# Patient Record
Sex: FEMALE | Race: WHITE
[De-identification: names, ages, dates, MRNs, and addresses within clinical notes are randomized per-mention and may not be internally consistent; named-entity substitution may affect disease eponyms.]

---

## 2021-08-03 ENCOUNTER — HOSPITAL ENCOUNTER (INPATIENT)
Dept: HOSPITAL 41 - JD.OBCHECK | Age: 21
LOS: 3 days | Discharge: HOME | DRG: 560 | End: 2021-08-06
Attending: OBSTETRICS & GYNECOLOGY | Admitting: OBSTETRICS & GYNECOLOGY
Payer: COMMERCIAL

## 2021-08-03 DIAGNOSIS — F41.9: ICD-10-CM

## 2021-08-03 DIAGNOSIS — Z20.822: ICD-10-CM

## 2021-08-03 DIAGNOSIS — F32.9: ICD-10-CM

## 2021-08-03 DIAGNOSIS — Z3A.37: ICD-10-CM

## 2021-08-03 PROCEDURE — U0002 COVID-19 LAB TEST NON-CDC: HCPCS

## 2021-08-03 RX ADMIN — EPHEDRINE SULFATE PRN MG: 50 INJECTION, SOLUTION INTRAVENOUS at 23:54

## 2021-08-03 RX ADMIN — EPHEDRINE SULFATE PRN MG: 50 INJECTION, SOLUTION INTRAVENOUS at 23:59

## 2021-08-03 NOTE — PCM.PREANE
Preanesthetic Assessment





- Procedure


Proposed Procedure: 





Labor epidural





- Anesthesia/Transfusion/Family Hx


Anesthesia History: Prior Anesthesia Without Reaction


Family History of Anesthesia Reaction: No


Transfusion History: No Prior Transfusion(s)


Intubation History: Unknown





- Review of Systems


General: No Symptoms


Pulmonary: No Symptoms


Cardiovascular: No Symptoms


Gastrointestinal: Abdominal Pain (uterine contraction), Nausea, Vomiting


Neurological: No Symptoms


Other: Reports: Anxiety





- Physical Assessment


NPO Status Date: 08/03/21


NPO Status Time: 12:00


Vital Signs: 





131/75


HR 81


 RR 16


98%


Height: 1.65 m


Weight: 96.162 kg


ASA Class: 2


Mental Status: Alert & Oriented x3


Airway Class: Mallampati = 2


Dentition: Reports: Normal Dentition


Thyro-Mental Finger Breadths: 3


Mouth Opening Finger Breadths: 3


ROM/Head Extension: Full


Lungs: Clear to Auscultation, Normal Respiratory Effort


Cardiovascular: Regular Rate, Regular Rhythm





- Lab


Values: 





Labs within acceptable limits and okay to proceed with epidural





- Allergies


Allergies/Adverse Reactions: 


                                    Allergies











Allergy/AdvReac Type Severity Reaction Status Date / Time


 


No Known Allergies Allergy   Verified 06/02/21 10:04














- Blood


Blood Available: No


Product(s) Available: None





- Anesthesia Plan


Pre-Op Medication Ordered: None





- Acknowledgements


Anesthesia Type Planned: Epidural


Pt an Appropriate Candidate for the Planned Anesthesia: Yes


Alternatives and Risks of Anesthesia Discussed w Pt/Guardian: Yes


Pt/Guardian Understands and Agrees with Anesthesia Plan: Yes





PreAnesthesia Questionnaire


Cardiovascular History: Reports: Other (See Below)


Other Cardiovascular History: heart palpitations-anxiety


Respiratory History: Reports: Bronchitis, Recurrent


Other Respiratory History: Bronchitis she had a year ago


Gastrointestinal History: Reports: GERD


OB/GYN History: Reports: Pregnancy


Neurological History: Reports: Migraines


Psychiatric History: Reports: Depression





- Past Surgical History


HEENT Surgical History: Reports: Oral Surgery


Other Musculoskeletal Surgeries/Procedures:: Broke left arm as a child (3rd 

grade)





- SUBSTANCE USE


Tobacco Use Status *Q: Never Tobacco User


Tobacco Use Within Last Twelve Months: No


Second Hand Smoke Exposure: No


Days Per Week of Alcohol Use: 0


Number of Drinks Per Day: 0


Total Drinks Per Week: 0


Recreational Drug Use History: No





- HOME MEDS


Home Medications: 


                                    Home Meds





Prenatal No122/Iron/Folic Acid [Prenatal Multi Tablet] 1 each PO DAILY 05/30/21 

[History]


FLUoxetine [PROzac] 20 mg PO DAILY 07/29/21 [History]


cephALEXin [Keflex] 500 mg PO Q6HR 08/01/21 [History]

## 2021-08-03 NOTE — PCM.LDHP
L&D History of Present Illness





- General


Date of Service: 21


Admit Problem/Dx: 


                   Patient Status Order with Admit Dx/Problem





21 16:15


Patient Status [ADT] Routine 








                           Admission Diagnosis/Problem











Admission Diagnosis/Problem    Pregnancy














Source of Information: Patient


History Limitations: Reports: No Limitations





- History of Present Illness


Introduction:: 





Patient is a 21 y/o  at 37 4/7 wks who presented to clinic today for 

assessment after small leakage of fluid noted at lunch.  Amnisure done then and 

positive.  Has had only scant leakage since.  doing well 





- Related Data


Allergies/Adverse Reactions: 


                                    Allergies











Allergy/AdvReac Type Severity Reaction Status Date / Time


 


No Known Allergies Allergy   Verified 21 10:04











Home Medications: 


                                    Home Meds





Prenatal No122/Iron/Folic Acid [Prenatal Multi Tablet] 1 each PO DAILY 21 

[History]


FLUoxetine [PROzac] 20 mg PO DAILY 21 [History]


cephALEXin [Keflex] 500 mg PO Q6HR 21 [History]











Past Medical History


Gastrointestinal History: Reports: GERD


OB/GYN History: Reports: Pregnancy, Spontaneous 


: 4


Para: 0


LMP (Approximate): Pregnant


Neurological History: Reports: Migraines


Psychiatric History: Reports: Anxiety, Depression, Suicide Attempt





- Past Surgical History


HEENT Surgical History: Reports: Oral Surgery


Female  Surgical History: Reports: D&C


Other Musculoskeletal Surgeries/Procedures:: Broke left arm as a child (3rd 

grade)





Social & Family History





- Family History


Family Medical History: No Pertinent Family History





- Tobacco Use


Tobacco Use Status *Q: Never Tobacco User


Second Hand Smoke Exposure: No





- Caffeine Use


Caffeine Use: Reports: Soda





- Alcohol Use


Alcohol Use History: No


Days Per Week of Alcohol Use: 0


Number of Drinks Per Day: 0


Total Drinks Per Week: 0





- Recreational Drug Use


Recreational Drug Use: No





H&P Review of Systems





- Review of Systems:


Review Of Systems: See Below


General: Reports: No Symptoms


Pulmonary: Reports: No Symptoms


Cardiovascular: Reports: No Symptoms


Gastrointestinal: Reports: No Symptoms


Genitourinary: Reports: No Symptoms


Musculoskeletal: Reports: No Symptoms


Psychiatric: Reports: No Symptoms


Neurological: Reports: No Symptoms





L&D Exam





- Exam


Exam: See Below





- Vital Signs


Vital Signs: 


                                Last Vital Signs











Temp  36.6 C   21 15:00


 


Pulse  93   21 15:00


 


Resp  16   21 15:00


 


BP  124/79   21 15:00


 


Pulse Ox  97   21 15:00











Weight: 96.162 kg





- OB Specific


Contraction Intensity: Irritability


Fetal Movement: Active


Fetal Heart Tones: Present


Fetal Heart Tones per Min: 140


Fetal Heart Rate (FHR) Variability: Moderate (6-25 bpm)


Birth Presentation: Vertex





- Berrios Score


Berrios Score Cervix Position: Midposition


Berrios Score Consistency: Medium


Berrios Score Effacement: 51-70%


Berrios Score Dilation: 1-2 cm


Berrios Score Infant's Station: -1 ,0


Berrios Score Total: 7





- Exam


General: Alert, Oriented, Cooperative


Lungs: Clear to Auscultation, Normal Respiratory Effort


Cardiovascular: Regular Rate, Regular Rhythm


GI/Abdominal Exam: Soft, Non-Tender


Genitourinary: Normal external exam


Extremities: Normal Inspection


Skin: Warm, Dry, Intact





- Patient Data


Lab Results Last 24 hrs: 


                         Laboratory Results - last 24 hr











  21 Range/Units





  16:26 16:33 16:33 


 


WBC   9.80   (3.98-10.04)  K/mm3


 


RBC   4.03   (3.98-5.22)  M/mm3


 


Hgb   11.1 L   (11.2-15.7)  gm/dl


 


Hct   34.3   (34.1-44.9)  %


 


MCV   85.1  D   (79.4-94.8)  fl


 


MCH   27.5   (25.6-32.2)  pg


 


MCHC   32.4   (32.2-35.5)  g/dl


 


RDW Std Deviation   41.5   (36.4-46.3)  fL


 


Plt Count   216   (182-369)  K/mm3


 


MPV   11.1   (9.4-12.3)  fl


 


Neut % (Auto)   79.0 H   (34.0-71.1)  %


 


Lymph % (Auto)   15.0 L   (19.3-51.7)  %


 


Mono % (Auto)   4.3 L   (4.7-12.5)  %


 


Eos % (Auto)   1.3   (0.7-5.8)  


 


Baso % (Auto)   0.1   (0.1-1.2)  %


 


Neut # (Auto)   7.74 H   (1.56-6.13)  K/mm3


 


Lymph # (Auto)   1.47   (1.18-3.74)  K/mm3


 


Mono # (Auto)   0.42 H   (0.24-0.36)  K/mm3


 


Eos # (Auto)   0.13   (0.04-0.36)  K/mm3


 


Baso # (Auto)   0.01   (0.01-0.08)  K/mm3


 


SARS-CoV-2 RNA (RAUL)  Negative    (NEGATIVE)  


 


Blood Type    B POSITIVE  


 


Gel Antibody Screen    Negative  











Result Diagrams: 


                                 21 16:33








- Problem List


(1) 37 weeks gestation of pregnancy


SNOMED Code(s): 49035939


   ICD Code: Z3A.37 - 37 WEEKS GESTATION OF PREGNANCY   Status: Acute   Current 

Visit: Yes   





(2) Premature rupture of membranes


SNOMED Code(s): 21313424


   ICD Code: O42.90 - ENE ROM, 7TH0 BETW RUPT & ONST LABR, UNSP WEEKS OF GEST  

 Status: Acute   Current Visit: Yes   


Qualifiers: 


   PROM onset of labor timing: unspecified duration between rupture of membranes

 and onset of labor   PROM gestational age: full term   Qualified Code(s): 

O42.92 - Full-term premature rupture of membranes, unspecified as to length of 

time between rupture and onset of labor   





(3) GBS (group B Streptococcus carrier), +RV culture, currently pregnant


SNOMED Code(s): 1788731448172, 228668966, 2068838308440


   ICD Code: O99.820 - STREPTOCOCCUS B CARRIER STATE COMPLICATING PREGNANCY   

Status: Acute   Current Visit: Yes   





(4) Rubella non-immune status, antepartum


SNOMED Code(s): 145764273


   ICD Code: O99.891 - OTH DISEASES AND CONDITIONS COMPLICATING PREGNANCY; Z28.3

 - UNDERIMMUNIZATION STATUS   Status: Acute   Current Visit: Yes   


Problem List Initiated/Reviewed/Updated: Yes


Orders Last 24hrs: 


                               Active Orders 24 hr











 Category Date Time Status


 


 Patient Status [ADT] Routine ADT  21 16:15 Active


 


 Activity as Tolerated [RC] PFP Care  21 16:15 Active


 


 Communication Order [RC] ASDIRECTED Care  21 16:15 Active


 


 Fetal Heart Tones [RC] ASDIRECTED Care  21 16:16 Active


 


 Fetal Non Stress Test [RC] PER UNIT ROUTINE Care  21 16:15 Active


 


 Notify Provider [RC] PFP Care  21 16:15 Active


 


 Notify Provider [RC] PRN Care  21 16:15 Active


 


 Peripheral IV Care [RC] .AS DIRECTED Care  21 16:16 Active


 


 Pump Management, Intrathecal [RC] ASDIRECTED Care  21 16:17 Active


 


 Urinary Catheter Assessment [RC] ASDIRECTED Care  21 16:14 Active


 


 Vital Signs [RC] PER UNIT ROUTINE Care  21 16:15 Active


 


 Regular Diet [DIET] Diet  21 Dinner Active


 


 PATIENT RETYPE [BBK] Routine Lab  21 17:44 Ordered


 


 RAPID PLASMA REAGIN,RPR [CHEM] Routine Lab  21 16:33 Received


 


 Ampicillin 1 gm Med  21 20:00 Active





 Sodium Chloride 0.9% [Normal Saline] 100 ml   





 IV Q4H   


 


 Lactated Ringers [Ringers, Lactated] 1,000 ml Med  21 16:15 Active





 IV ASDIRECTED   


 


 Lidocaine 1% [Xylocaine 1%] Med  21 16:14 Active





 50 ml INJECT ONETIME   


 


 Nalbuphine [Nubain] Med  21 16:14 Active





 10 mg IVPUSH Q2H PRN   


 


 Oxytocin/Lactated Ringers [Pitocin in LR 10 Units/1,000 Med  21 16:15 

Active





 ML]   





 10 unit in 1,000 ml IV .CONTINUOUS   


 


 Oxytocin/Lactated Ringers [Pitocin in LR 10 Units/1,000 Med  21 16:15 

Active





 ML]   





 10 unit in 1,000 ml IV TITRATE   


 


 Sodium Chloride 0.9% [Saline Flush] Med  21 16:14 Active





 10 ml FLUSH ASDIRECTED PRN   


 


 Electronic Fetal Heart Tones Ext w TOCO [WOMSER] Oth  21 16:15 Ordered





 Routine   


 


 Electronic Fetal Heart Tones Internal [WOMSER] Per Unit Oth  21 16:15 

Ordered





 Routine   


 


 Peripheral IV Insertion Adult [OM.PC] Routine Oth  21 16:15 Ordered


 


 Resuscitation Status Routine Resus Stat  21 16:14 Ordered








                                Medication Orders





Ampicillin Sodium 1 gm/ Sodium (Chloride)  100 mls @ 200 mls/hr IV Q4H ELAINE


Oxytocin/Lactated Ringer's (Pitocin In Lr 10 Units/1,000 Ml)  10 unit in 1,000 

mls @ 12 mls/hr IV TITRATE ELAINE; Protocol


Oxytocin/Lactated Ringer's (Pitocin In Lr 10 Units/1,000 Ml)  10 unit in 1,000 

mls @ 500 mls/hr IV .CONTINUOUS ELAINE; Protocol


Lactated Ringer's (Ringers, Lactated)  1,000 mls @ 100 mls/hr IV ASDIRECTED ELAINE


   Last Admin: 21 18:08  Dose: 100 mls/hr


   Documented by: [id number]


Lidocaine HCl (Lidocaine 1% 50 Ml Mdv)  50 ml INJECT ONETIME ELAINE


Nalbuphine HCl (Nalbuphine 10 Mg/1 Ml Vial)  10 mg IVPUSH Q2H PRN


   PRN Reason: Pain


Sodium Chloride (Sodium Chloride 0.9% 10 Ml Syringe)  10 ml FLUSH ASDIRECTED PRN


   PRN Reason: Keep Vein Open








Assessment/Plan Comment:: 








* Labs ordered


* GBS positive, Ampicillin ordered 


* Will monitor for a few hours after ROM, but will start pitocin if no start of 

  consistent contractions 


* Pain management per patient preference 


* Anticipate  


* MMR after delivery

## 2021-08-04 PROCEDURE — 10907ZC DRAINAGE OF AMNIOTIC FLUID, THERAPEUTIC FROM PRODUCTS OF CONCEPTION, VIA NATURAL OR ARTIFICIAL OPENING: ICD-10-PCS | Performed by: OBSTETRICS & GYNECOLOGY

## 2021-08-04 PROCEDURE — 3E0R3BZ INTRODUCTION OF ANESTHETIC AGENT INTO SPINAL CANAL, PERCUTANEOUS APPROACH: ICD-10-PCS | Performed by: OBSTETRICS & GYNECOLOGY

## 2021-08-04 PROCEDURE — 0HQ9XZZ REPAIR PERINEUM SKIN, EXTERNAL APPROACH: ICD-10-PCS | Performed by: OBSTETRICS & GYNECOLOGY

## 2021-08-04 NOTE — [id number]
Post Anesthesia Note





- EVALUATION WITHIN 48HRS OF ANESTHETIC


Vital Signs in Normal Range: Yes


Patient Participated in Evaluation: Yes


Respiratory Function Stable: Yes


Airway Patent: Yes


Cardiovascular Function Stable: Yes


Hydration Status Stable: Yes


Pain Control Satisfactory: Yes


Nausea and Vomiting Control Satisfactory: Yes


Mental Status Recovered: Yes


Vital Signs: 


                                Last Vital Signs











Temp  97.9 F   08/03/21 15:00


 


Pulse  93   08/03/21 15:00


 


Resp  16   08/03/21 15:00


 


BP  124/79   08/03/21 15:00


 


Pulse Ox  97   08/03/21 15:00














- COMMENTS/OBSERVATIONS


Free Text/Narrative:: 





no complaints. nursing

## 2021-08-04 NOTE — PCM.DEL
L & D Note





- General Info


Date of Service: 21





- Delivery Note


Labor: Augmented by Oxytocin


Delivery Outcome: Livebirth


Infant Delivery Method: Spontaneous Vaginal Delivery-Single


Infant Delivery Mode: Vacuum Extraction


Birth Presentation: Left Occiput Anterior (LUIS A)


Nuchal Cord: None


Anesthesia Type: Epidural


Amniotic Fluid Description: Clear


Episiotomy Type: None


Laceration: 1st Degree


Suture type: Vicryl


Suture size: 2-0


Placenta: Intact, Spontaneous


Cord: 3 Vessels


Estimated Blood Loss: 200


Resuscitation Needed: Yes


Donalds: Bulb Syringe, Stimulated, Warmed, Charlotte Used, Warmer Used


Delivery Comments (Free Text/Narrative):: 





The patient was pushing in the dorsal lithotomy position.  Sterile vaginal exam 

showed patient to be complete and .  Patient pushing for about 2.5 hours

and strongly desired assistance due to exhaustion.  Fetal head in LUIS A 

presentation.  The mushroom cup was placed without difficulty.  Subsequent 

vacuum assisted vaginal delivery with pushing over 1 contraction.  Total 

pressure applied 550 mm Hg.  Total pop offs: 0.  Suction was removed following 

delivery of the fetal head.  No nuchal cord.  The remainder of the infant 

delivered without difficulty.  Infant placed on maternal abdomen.  Cord clamped 

and cut.  Cord blood obtained.  Placenta allowed time to separate and expelled 

intact.  Inspection of the perineum following delivery with 1st degree 

laceration repaired with interrupted suture of 2-0 Vicryl 














- General Info


Date of Service: 21





- Patient Data


Vitals - Most Recent: 


                                Last Vital Signs











Temp  36.6 C   21 15:00


 


Pulse  93   21 15:00


 


Resp  16   21 15:00


 


BP  124/79   21 15:00


 


Pulse Ox  97   21 15:00











Weight - Most Recent: 96.162 kg





- Exam


Urinary Catheter Total Time: 0Days  0Hours





- Problem List & Annotations


(1) 37 weeks gestation of pregnancy


SNOMED Code(s): 74457654


   Code(s): Z3A.37 - 37 WEEKS GESTATION OF PREGNANCY   Status: Acute   Current 

Visit: Yes   





(2) Premature rupture of membranes


SNOMED Code(s): 64384440


   Code(s): O42.90 - ENE ROM, 7TH0 BETW RUPT & ONST LABR, UNSP WEEKS OF GEST   

Status: Acute   Current Visit: Yes   


Qualifiers: 


   PROM onset of labor timing: unspecified duration between rupture of membranes

and onset of labor   PROM gestational age: full term   Qualified Code(s): O42.92

- Full-term premature rupture of membranes, unspecified as to length of time 

between rupture and onset of labor   





(3) GBS (group B Streptococcus carrier), +RV culture, currently pregnant


SNOMED Code(s): 7979025383724, 849862945, 0335349551724


   Code(s): O99.820 - STREPTOCOCCUS B CARRIER STATE COMPLICATING PREGNANCY   

Status: Acute   Current Visit: Yes   





(4) Rubella non-immune status, antepartum


SNOMED Code(s): 941636514


   Code(s): O99.891 - OTH DISEASES AND CONDITIONS COMPLICATING PREGNANCY; Z28.3 

- UNDERIMMUNIZATION STATUS   Status: Acute   Current Visit: Yes   





(5) Vacuum extraction, delivered, current hospitalization


SNOMED Code(s): 383672658


   Code(s): O75.9 - COMPLICATION OF LABOR AND DELIVERY, UNSPECIFIED   Status: 

Acute   Current Visit: Yes   





- Problem List Review


Problem List Initiated/Reviewed/Updated: Yes





- My Orders


Last 24 Hours: 


My Active Orders





21 16:14


Urinary Catheter Assessment [RC] ASDIRECTED 


Lidocaine 1% [Xylocaine 1%]   50 ml INJECT ONETIME 


Nalbuphine [Nubain]   10 mg IVPUSH Q2H PRN 


Sodium Chloride 0.9% [Saline Flush]   10 ml FLUSH ASDIRECTED PRN 


Resuscitation Status Routine 





21 16:15


Patient Status [ADT] Routine 


Activity as Tolerated [RC] PFP 


Communication Order [RC] ASDIRECTED 


Notify Provider [RC] PFP 


Notify Provider [RC] PRN 


Vital Signs [RC] PER UNIT ROUTINE 


Lactated Ringers [Ringers, Lactated] 1,000 ml IV ASDIRECTED 


Oxytocin/Lactated Ringers [Pitocin in LR 10 Units/1,000 ML] 10 unit in 1,000 ml 

IV .CONTINUOUS 


Oxytocin/Lactated Ringers [Pitocin in LR 10 Units/1,000 ML] 10 unit in 1,000 ml 

IV TITRATE 


Electronic Fetal Heart Tones Ext w TOCO [WOMSER] Routine 


Electronic Fetal Heart Tones Internal [WOMSER] Per Unit Routine 


Peripheral IV Insertion Adult [OM.PC] Routine 





21 16:16


Fetal Heart Tones [RC] ASDIRECTED 


Peripheral IV Care [RC] .AS DIRECTED 





21 16:17


Pump Management, Intrathecal [RC] ASDIRECTED 





21 Dinner


Regular Diet [DIET] 





21 21:30


Ampicillin 1 gm   Sodium Chloride 0.9% [Normal Saline] 100 ml IV Q4H 














- Assessment


Assessment:: 





PPD#0





- Plan


Plan:: 








* Routine cares


* Breast feeding 


* Discharge home in 1-2 days 


* MMR after delivery

## 2021-08-05 NOTE — PCM.PNPP
- General Info


Date of Service: 21


Functional Status: Reports: Pain Controlled, Tolerating Diet, Ambulating, 

Urinating





- Review of Systems


General: Reports: No Symptoms


Pulmonary: Reports: No Symptoms


Cardiovascular: Reports: No Symptoms


Gastrointestinal: Reports: No Symptoms


Genitourinary: Reports: No Symptoms


Musculoskeletal: Reports: No Symptoms





- Patient Data


Vital Signs - Most Recent: 


                                Last Vital Signs











Temp  37.0 C   21 19:55


 


Pulse  91   21 19:55


 


Resp  14   21 19:55


 


BP  124/73   21 19:55


 


Pulse Ox  99   21 19:55











Weight - Most Recent: 96.162 kg


Med Orders - Current: 


                               Current Medications





Acetaminophen (Acetaminophen 325 Mg Tab)  650 mg PO Q4H PRN


   PRN Reason: mild pain or fever


Benzocaine/Menthol (Benzocaine/Menthol 20%-0.5% Spray 56 Gm Canister)  0 gm TOP 

ASDIRECTED PRN


   PRN Reason: Perineal Comfort Measure


   Last Admin: 21 08:39 Dose:  1 can


   Documented by: 


Docusate Sodium (Docusate Sodium 100 Mg Cap)  100 mg PO BID PRN


   PRN Reason: Constipation


Fluoxetine HCl (Fluoxetine 20 Mg Cap)  20 mg PO DAILY ELAINE


   Last Admin: 21 17:37 Dose:  Not Given


   Documented by: 


Ibuprofen (Ibuprofen 600 Mg Tab)  600 mg PO Q6H PRN


   PRN Reason: Mild pain or fever


   Last Admin: 21 22:16 Dose:  600 mg


   Documented by: 


Measles/Mumps/Rubella Vaccine Live (Measles, Mumps & Rubella Vaccine 0.5 Ml Sdv)

 0.5 ml SUBCUT .ONCE ONE


   Stop: 21 09:01


Witch Hazel (Witch Hazel Medicated Pads 40/Jar)  1 pad TOP ASDIRECTED PRN


   PRN Reason: Perineal Comfort Measure


   Last Admin: 21 08:38 Dose:  1 tub


   Documented by: 





Discontinued Medications





Bupivacaine HCl (Bupivacaine 0.25% 10 Ml Sdv)  10 ml .ROUTE .STK-MED ONE


   Stop: 21 00:01


Diphenhydramine HCl (Diphenhydramine 50 Mg/Ml Sdv)  25 mg IVPUSH Q6H PRN


   PRN Reason: pruritis


Ephedrine Sulfate (Ephedrine 50 Mg/Ml Sdv)  5 mg IVPUSH ASDIRECTED PRN


   PRN Reason: Hypotension


   Last Admin: 21 23:59 Dose:  5 mg


   Documented by: 


Ephedrine Sulfate (Ephedrine 50 Mg/Ml Sdv)  50 mg .ROUTE .STK-MED ONE


   Stop: 21 00:01


Fentanyl (Fentanyl 100 Mcg/2 Ml Sdv)  100 mcg EPIDUR Q3H PRN


   PRN Reason: Pain


   Last Admin: 21 22:47 Dose:  100 mcg


   Documented by: 


Fentanyl/Bupivacaine HCl (Bupivacaine/Fentanyl/Ns 100 Ml Bag)  100 ml EPIDUR 

ASDIRECTED PRN


   PRN Reason: Pain


   Last Admin: 21 22:47 Dose:  100 ml


   Documented by: 


Ampicillin Sodium 2 gm/ Sodium (Chloride)  100 mls @ 200 mls/hr IV ONETIME ONE


   Stop: 21 16:43


   Last Admin: 21 16:55 Dose:  200 mls/hr


   Documented by: 


Ampicillin Sodium 1 gm/ Sodium (Chloride)  100 mls @ 200 mls/hr IV Q4H ELAINE


Oxytocin/Lactated Ringer's (Pitocin In Lr 10 Units/1,000 Ml)  10 unit in 1,000 

mls @ 12 mls/hr IV TITRATE ELAINE; Protocol


   Last Titration: 21 01:50 Dose:  8 munits/min, 48 mls/hr


   Documented by: 


Oxytocin/Lactated Ringer's (Pitocin In Lr 10 Units/1,000 Ml)  10 unit in 1,000 

mls @ 500 mls/hr IV .CONTINUOUS ELAINE; Protocol


Lactated Ringer's (Ringers, Lactated)  1,000 mls @ 100 mls/hr IV ASDIRECTED ELAINE


   Last Admin: 21 01:15 Dose:  100 mls/hr


   Documented by: 


Ampicillin Sodium 1 gm/ Sodium (Chloride)  100 mls @ 200 mls/hr IV Q4H ELAINE


   Last Admin: 21 12:26 Dose:  Not Given


   Documented by: 


Lidocaine HCl (Lidocaine 1% 50 Ml Mdv)  50 ml INJECT ONETIME ELAINE


Nalbuphine HCl (Nalbuphine 10 Mg/1 Ml Vial)  10 mg IVPUSH Q2H PRN


   PRN Reason: Pain


Sodium Chloride (Sodium Chloride 0.9% 10 Ml Syringe)  10 ml FLUSH ASDIRECTED PRN


   PRN Reason: Keep Vein Open











- Infant Interaction


Infant Disposition, Postpartum: Jerome in Room with Family


Infant Interaction: Holding Infant


Infant Feeding: Attempted Breastfeeding; Nursed Fair/Poor


Support Person: 





- Postpartum Recovery Exam


Fundal Tone: Firm


Fundal Level: 1 Fingerbreadths Below Umbilicus


Fundal Placement: Midline


Lochia Amount: Small


Lochia Color: Rubra/Red


Perineum Description: Other (see below)


Other Perinuem Description: 1st degree lac with repair


Episiotomy/Laceration: Approximated


Bladder Status: Voiding


Urinary Elimination: Voided





- Exam


General: Alert, Oriented, Cooperative


GI/Abdominal Exam: Soft, Non-Tender





- Problem List & Annotations


(1) 37 weeks gestation of pregnancy


SNOMED Code(s): 64143791


   Code(s): Z3A.37 - 37 WEEKS GESTATION OF PREGNANCY   Status: Acute   Current 

Visit: Yes   





(2) Premature rupture of membranes


SNOMED Code(s): 92638147


   Code(s): O42.90 - ENE ROM, 7TH0 BETW RUPT & ONST LABR, UNSP WEEKS OF GEST   

Status: Acute   Current Visit: Yes   


Qualifiers: 


   PROM onset of labor timing: unspecified duration between rupture of membranes

and onset of labor   PROM gestational age: full term   Qualified Code(s): O42.92

- Full-term premature rupture of membranes, unspecified as to length of time 

between rupture and onset of labor   





(3) GBS (group B Streptococcus carrier), +RV culture, currently pregnant


SNOMED Code(s): 7005446244530, 992409395, 2255137479375


   Code(s): O99.820 - STREPTOCOCCUS B CARRIER STATE COMPLICATING PREGNANCY   

Status: Acute   Current Visit: Yes   





(4) Rubella non-immune status, antepartum


SNOMED Code(s): 556550168


   Code(s): O99.891 - OTH DISEASES AND CONDITIONS COMPLICATING PREGNANCY; Z28.3 

- UNDERIMMUNIZATION STATUS   Status: Acute   Current Visit: Yes   





(5) Vacuum extraction, delivered, current hospitalization


SNOMED Code(s): 505329311


   Code(s): O75.9 - COMPLICATION OF LABOR AND DELIVERY, UNSPECIFIED   Status: 

Acute   Current Visit: Yes   





- Problem List Review


Problem List Initiated/Reviewed/Updated: Yes





- My Orders


Last 24 Hours: 


My Active Orders





21 Breakfast


Regular Diet [DIET] 





21 07:40


Acetaminophen [TylenoL]   650 mg PO Q4H PRN 


Benzocaine/Menthol [Dermoplast Pain Relief Spray]   See Dose Instructions  TOP 

ASDIRECTED PRN 


Docusate Sodium [Colace]   100 mg PO BID PRN 


Ibuprofen [Motrin]   600 mg PO Q6H PRN 


witch hazeL [Tucks]   1 pad TOP ASDIRECTED PRN 


Heat Therapy [OM.PC] PRN 





21 07:40


Activity as Tolerated [RC] PER UNIT ROUTINE 


Vital Signs [RC] ,,15, 


Assess Lochia [WOMSER] Per Unit Routine 


Assess Uterine Involution [WOMSER] Per Unit Routine 


Breast Pump [WOMSER] Per Unit Routine 


Perineal Care [OM.PC] Per Unit Routine 


Peripheral IV Discontinue [OM.PC] Routine 


Sitz Bath [OM.PC] Per Unit Routine 





21 16:00


FLUoxetine [PROzac]   20 mg PO DAILY 





21 07:39


Vaccines to be Administered [RC] PER UNIT ROUTINE 





21 07:40


Heat Therapy [OM.PC] PRN 





21 09:00


Measles, Mumps & Rubella [M-M-R II Vaccine]   0.5 ml SUBCUT .ONCE ONE 














- Assessment


Assessment:: 





PPD#1





- Plan


Plan:: 








* Routine cares


* Breast feeding 


* Discharge home tomorrow 


* MMR today

## 2021-08-06 NOTE — PCM.DCSUM1
**Discharge Summary





- Discharge Data


Discharge Date: 21


Discharge Disposition: Home, Self-Care 01


Condition: Good





- Referral to Home Health


Primary Care Physician: 


SELMA Johnson








- Discharge Diagnosis/Problem(s)


(1) 37 weeks gestation of pregnancy


SNOMED Code(s): 31092596


   ICD Code: Z3A.37 - 37 WEEKS GESTATION OF PREGNANCY   Status: Acute   Current 

Visit: Yes   





(2) Premature rupture of membranes


SNOMED Code(s): 65817933


   ICD Code: O42.90 - ENE ROM, 7TH0 BETW RUPT & ONST LABR, UNSP WEEKS OF GEST  

Status: Acute   Current Visit: Yes   


Qualifiers: 


   PROM onset of labor timing: unspecified duration between rupture of membranes

and onset of labor   PROM gestational age: full term   Qualified Code(s): O42.92

- Full-term premature rupture of membranes, unspecified as to length of time 

between rupture and onset of labor   





(3) GBS (group B Streptococcus carrier), +RV culture, currently pregnant


SNOMED Code(s): 3170290163758, 262229520, 1939040098527


   ICD Code: O99.820 - STREPTOCOCCUS B CARRIER STATE COMPLICATING PREGNANCY   

Status: Acute   Current Visit: Yes   





(4) Rubella non-immune status, antepartum


SNOMED Code(s): 264864556


   ICD Code: O99.891 - OTH DISEASES AND CONDITIONS COMPLICATING PREGNANCY; Z28.3

- UNDERIMMUNIZATION STATUS   Status: Acute   Current Visit: Yes   





(5) Vacuum extraction, delivered, current hospitalization


SNOMED Code(s): 444043983


   ICD Code: O75.9 - COMPLICATION OF LABOR AND DELIVERY, UNSPECIFIED   Status: 

Acute   Current Visit: Yes   





- Patient Summary/Data


Complications: None


Consults: 


None


Recommended Follow-up Testing/Procedures: 


Follow up in 3 weeks for postpartum check 


Hospital Course: 


19 y/o  at 37 4/7 wks who presented with PROM.  Was augmented with 

pitocin.  Progressed well to complete dilation.  Underwent a VAVD due to 

maternal exhaustion.  See delivery note.  Postpartum did well and was discharged

home on PPD#2 





- Patient Instructions


Diet: Regular Diet as Tolerated


Activity: As Tolerated


Activity, Other: Pelvic rest for 6 weeks 


Driving: May Drive Today


Showering/Bathing: May Shower


Showering/Bathing, Other: May Bathe 


Notify Provider of: Fever, Increased Pain, Swelling and Redness, Drainage, 

Nausea and/or Vomiting





- Discharge Plan


*PRESCRIPTION DRUG MONITORING PROGRAM REVIEWED*: No


*COPY OF PRESCRIPTION DRUG MONITORING REPORT IN PATIENT RIDGE: No


Home Medications: 


                                    Home Meds





Prenatal No122/Iron/Folic Acid [Prenatal Multi Tablet] 1 each PO DAILY 21 

[History]


FLUoxetine HCl [Prozac] 20 mg PO DAILY 21 [History]


Acetaminophen [Tylenol] 650 mg PO Q4H PRN  tablet 21 [Rx]


Docusate Sodium [Colace] 100 mg PO BID PRN  cap 21 [Rx]


Ibuprofen [Motrin] 600 mg PO Q6H PRN  tablet 21 [Rx]








Referrals: 


Joaquina Villalobos MD [Physician] -  (3 weeks for postpartum check )





- Discharge Summary/Plan Comment


DC Time >30 min.: No





- Patient Data


Vitals - Most Recent: 


                                Last Vital Signs











Temp  37.2 C   21 21:39


 


Pulse  80   21 21:39


 


Resp  16   21 21:39


 


BP  118/68   21 21:39


 


Pulse Ox  100   21 21:39











Weight - Most Recent: 96.162 kg


I&O - Last 24 hours: 


                                 Intake & Output











 21





 14:59 22:59 06:59


 


Intake Total  240 


 


Balance  240 











Med Orders - Current: 


                               Current Medications





Acetaminophen (Acetaminophen 325 Mg Tab)  650 mg PO Q4H PRN


   PRN Reason: mild pain or fever


   Last Admin: 21 16:07 Dose:  650 mg


   Documented by: 


Benzocaine/Menthol (Benzocaine/Menthol 20%-0.5% Spray 56 Gm Canister)  0 gm TOP 

ASDIRECTED PRN


   PRN Reason: Perineal Comfort Measure


   Last Admin: 21 08:39 Dose:  1 can


   Documented by: 


Docusate Sodium (Docusate Sodium 100 Mg Cap)  100 mg PO BID PRN


   PRN Reason: Constipation


   Last Admin: 21 08:09 Dose:  100 mg


   Documented by: 


Fluoxetine HCl (Fluoxetine 20 Mg Cap)  20 mg PO DAILY ELAINE


   Last Admin: 21 11:45 Dose:  Not Given


   Documented by: 


Ibuprofen (Ibuprofen 600 Mg Tab)  600 mg PO Q6H PRN


   PRN Reason: Mild pain or fever


   Last Admin: 21 17:57 Dose:  600 mg


   Documented by: 


Witch Hazel (Witch Hazel Medicated Pads 40/Jar)  1 pad TOP ASDIRECTED PRN


   PRN Reason: Perineal Comfort Measure


   Last Admin: 21 08:38 Dose:  1 tub


   Documented by: 





Discontinued Medications





Bupivacaine HCl (Bupivacaine 0.25% 10 Ml Sdv)  10 ml .ROUTE .[employer]-MED ONE


   Stop: 21 00:01


Diphenhydramine HCl (Diphenhydramine 50 Mg/Ml Sdv)  25 mg IVPUSH Q6H PRN


   PRN Reason: pruritis


Ephedrine Sulfate (Ephedrine 50 Mg/Ml Sdv)  5 mg IVPUSH ASDIRECTED PRN


   PRN Reason: Hypotension


   Last Admin: 21 23:59 Dose:  5 mg


   Documented by: 


Ephedrine Sulfate (Ephedrine 50 Mg/Ml Sdv)  50 mg .ROUTE .[facility]-MED ONE


   Stop: 21 00:01


Fentanyl (Fentanyl 100 Mcg/2 Ml Sdv)  100 mcg EPIDUR Q3H PRN


   PRN Reason: Pain


   Last Admin: 21 22:47 Dose:  100 mcg


   Documented by: 


Fentanyl/Bupivacaine HCl (Bupivacaine/Fentanyl/Ns 100 Ml Bag)  100 ml EPIDUR 

ASDIRECTED PRN


   PRN Reason: Pain


   Last Admin: 21 22:47 Dose:  100 ml


   Documented by: 


Ampicillin Sodium 2 gm/ Sodium (Chloride)  100 mls @ 200 mls/hr IV ONETIME ONE


   Stop: 21 16:43


   Last Admin: 21 16:55 Dose:  200 mls/hr


   Documented by: 


Ampicillin Sodium 1 gm/ Sodium (Chloride)  100 mls @ 200 mls/hr IV Q4H ELAINE


Oxytocin/Lactated Ringer's (Pitocin In Lr 10 Units/1,000 Ml)  10 unit in 1,000 

mls @ 12 mls/hr IV TITRATE ELAINE; Protocol


   Last Titration: 21 01:50 Dose:  8 munits/min, 48 mls/hr


   Documented by: 


Oxytocin/Lactated Ringer's (Pitocin In Lr 10 Units/1,000 Ml)  10 unit in 1,000 

mls @ 500 mls/hr IV .CONTINUOUS ELAINE; Protocol


Lactated Ringer's (Ringers, Lactated)  1,000 mls @ 100 mls/hr IV ASDIRECTED ELAINE


   Last Admin: 21 01:15 Dose:  100 mls/hr


   Documented by: 


Ampicillin Sodium 1 gm/ Sodium (Chloride)  100 mls @ 200 mls/hr IV Q4H ELAINE


   Last Admin: 21 12:26 Dose:  Not Given


   Documented by: 


Lidocaine HCl (Lidocaine 1% 50 Ml Mdv)  50 ml INJECT ONETIME [facility]


Measles/Mumps/Rubella Vaccine Live (Measles, Mumps & Rubella Vaccine 0.5 Ml Sdv)

 0.5 ml SUBCUT .ONCE ONE


   Stop: 21 09:01


Nalbuphine HCl (Nalbuphine 10 Mg/1 Ml Vial)  10 mg IVPUSH Q2H PRN


   PRN Reason: Pain


Sodium Chloride (Sodium Chloride 0.9% 10 Ml Syringe)  10 ml FLUSH ASDIRECTED PRN


   PRN Reason: Keep Vein Open

## 2021-08-06 NOTE — PCM.PNPP
- General Info


Date of Service: 21


Functional Status: Reports: Pain Controlled, Tolerating Diet, Ambulating, 

Urinating





- Review of Systems


General: Reports: No Symptoms


Pulmonary: Reports: No Symptoms


Cardiovascular: Reports: No Symptoms


Gastrointestinal: Reports: No Symptoms


Genitourinary: Reports: No Symptoms


Musculoskeletal: Reports: No Symptoms





- Patient Data


Vital Signs - Most Recent: 


                                Last Vital Signs











Temp  37.2 C   21 21:39


 


Pulse  80   21 21:39


 


Resp  16   21 21:39


 


BP  118/68   21 21:39


 


Pulse Ox  100   21 21:39











Weight - Most Recent: 96.162 kg


I&O - Last 24 Hours: 


                                 Intake & Output











 21





 14:59 22:59 06:59


 


Intake Total  240 


 


Balance  240 











Med Orders - Current: 


                               Current Medications





Acetaminophen (Acetaminophen 325 Mg Tab)  650 mg PO Q4H PRN


   PRN Reason: mild pain or fever


   Last Admin: 21 16:07 Dose:  650 mg


   Documented by: 


Benzocaine/Menthol (Benzocaine/Menthol 20%-0.5% Spray 56 Gm Canister)  0 gm TOP 

ASDIRECTED PRN


   PRN Reason: Perineal Comfort Measure


   Last Admin: 21 08:39 Dose:  1 can


   Documented by: 


Docusate Sodium (Docusate Sodium 100 Mg Cap)  100 mg PO BID PRN


   PRN Reason: Constipation


   Last Admin: 21 08:09 Dose:  100 mg


   Documented by: 


Fluoxetine HCl (Fluoxetine 20 Mg Cap)  20 mg PO DAILY ELAINE


   Last Admin: 21 11:45 Dose:  Not Given


   Documented by: 


Ibuprofen (Ibuprofen 600 Mg Tab)  600 mg PO Q6H PRN


   PRN Reason: Mild pain or fever


   Last Admin: 21 17:57 Dose:  600 mg


   Documented by: 


Witch Hazel (Witch Hazel Medicated Pads 40/Jar)  1 pad TOP ASDIRECTED PRN


   PRN Reason: Perineal Comfort Measure


   Last Admin: 21 08:38 Dose:  1 tub


   Documented by: 





Discontinued Medications





Bupivacaine HCl (Bupivacaine 0.25% 10 Ml Sdv)  10 ml .ROUTE .STK-MED ONE


   Stop: 21 00:01


Diphenhydramine HCl (Diphenhydramine 50 Mg/Ml Sdv)  25 mg IVPUSH Q6H PRN


   PRN Reason: pruritis


Ephedrine Sulfate (Ephedrine 50 Mg/Ml Sdv)  5 mg IVPUSH ASDIRECTED PRN


   PRN Reason: Hypotension


   Last Admin: 21 23:59 Dose:  5 mg


   Documented by: 


Ephedrine Sulfate (Ephedrine 50 Mg/Ml Sdv)  50 mg .ROUTE .STK-MED ONE


   Stop: 21 00:01


Fentanyl (Fentanyl 100 Mcg/2 Ml Sdv)  100 mcg EPIDUR Q3H PRN


   PRN Reason: Pain


   Last Admin: 21 22:47 Dose:  100 mcg


   Documented by: 


Fentanyl/Bupivacaine HCl (Bupivacaine/Fentanyl/Ns 100 Ml Bag)  100 ml EPIDUR 

ASDIRECTED PRN


   PRN Reason: Pain


   Last Admin: 21 22:47 Dose:  100 ml


   Documented by: 


Ampicillin Sodium 2 gm/ Sodium (Chloride)  100 mls @ 200 mls/hr IV ONETIME ONE


   Stop: 21 16:43


   Last Admin: 21 16:55 Dose:  200 mls/hr


   Documented by: 


Ampicillin Sodium 1 gm/ Sodium (Chloride)  100 mls @ 200 mls/hr IV Q4H ELAINE


Oxytocin/Lactated Ringer's (Pitocin In Lr 10 Units/1,000 Ml)  10 unit in 1,000 

mls @ 12 mls/hr IV TITRATE ELAINE; Protocol


   Last Titration: 21 01:50 Dose:  8 munits/min, 48 mls/hr


   Documented by: 


Oxytocin/Lactated Ringer's (Pitocin In Lr 10 Units/1,000 Ml)  10 unit in 1,000 

mls @ 500 mls/hr IV .CONTINUOUS ELAINE; Protocol


Lactated Ringer's (Ringers, Lactated)  1,000 mls @ 100 mls/hr IV ASDIRECTED ELAINE


   Last Admin: 21 01:15 Dose:  100 mls/hr


   Documented by: 


Ampicillin Sodium 1 gm/ Sodium (Chloride)  100 mls @ 200 mls/hr IV Q4H ELAINE


   Last Admin: 21 12:26 Dose:  Not Given


   Documented by: 


Lidocaine HCl (Lidocaine 1% 50 Ml Mdv)  50 ml INJECT ONETIME ELAINE


Measles/Mumps/Rubella Vaccine Live (Measles, Mumps & Rubella Vaccine 0.5 Ml Sdv)

 0.5 ml SUBCUT .ONCE ONE


   Stop: 21 09:01


Nalbuphine HCl (Nalbuphine 10 Mg/1 Ml Vial)  10 mg IVPUSH Q2H PRN


   PRN Reason: Pain


Sodium Chloride (Sodium Chloride 0.9% 10 Ml Syringe)  10 ml FLUSH ASDIRECTED PRN


   PRN Reason: Keep Vein Open











- Infant Interaction


Infant Disposition, Postpartum:  in Room with Family


Infant Interaction: Holding Infant


Infant Feeding: Attempted Breastfeeding; Nursed Fair/Poor


Support Person: 





- Postpartum Recovery Exam


Fundal Tone: Firm


Fundal Level: 1 Fingerbreadths Below Umbilicus


Fundal Placement: Midline


Lochia Amount: Small


Lochia Color: Rubra/Red


Perineum Description: Intact, Minimal Bruising/Swelling


Other Perinuem Description: 1st degree lac with repair


Episiotomy/Laceration: Approximated


Bladder Status: Voiding


Urinary Elimination: Voided





- Exam


General: Alert, Oriented, Cooperative


GI/Abdominal Exam: Soft, Non-Tender





- Problem List & Annotations


(1) 37 weeks gestation of pregnancy


SNOMED Code(s): 86371190


   Code(s): Z3A.37 - 37 WEEKS GESTATION OF PREGNANCY   Status: Acute   Current 

Visit: Yes   





(2) Premature rupture of membranes


SNOMED Code(s): 92014770


   Code(s): O42.90 - ENE ROM, 7TH0 BETW RUPT & ONST LABR, UNSP WEEKS OF GEST   

Status: Acute   Current Visit: Yes   


Qualifiers: 


   PROM onset of labor timing: unspecified duration between rupture of membranes

and onset of labor   PROM gestational age: full term   Qualified Code(s): O42.92

- Full-term premature rupture of membranes, unspecified as to length of time 

between rupture and onset of labor   





(3) GBS (group B Streptococcus carrier), +RV culture, currently pregnant


SNOMED Code(s): 3463933337106, 340567992, 4391856783815


   Code(s): O99.820 - STREPTOCOCCUS B CARRIER STATE COMPLICATING PREGNANCY   

Status: Acute   Current Visit: Yes   





(4) Rubella non-immune status, antepartum


SNOMED Code(s): 168195691


   Code(s): O99.891 - OTH DISEASES AND CONDITIONS COMPLICATING PREGNANCY; Z28.3 

- UNDERIMMUNIZATION STATUS   Status: Acute   Current Visit: Yes   





(5) Vacuum extraction, delivered, current hospitalization


SNOMED Code(s): 600158479


   Code(s): O75.9 - COMPLICATION OF LABOR AND DELIVERY, UNSPECIFIED   Status: 

Acute   Current Visit: Yes   





- Problem List Review


Problem List Initiated/Reviewed/Updated: Yes





- My Orders


Last 24 Hours: 


My Active Orders





21 07:39


Vaccines to be Administered [RC] PER UNIT ROUTINE 





21 07:40


Heat Therapy [OM.PC] PRN 





21 01:06


Ready for Discharge [RC] PER UNIT ROUTINE 














- Assessment


Assessment:: 





PPD#2





- Plan


Plan:: 








* Routine cares


* Breast feeding 


* Discharge home today

## 2022-07-13 ENCOUNTER — HOSPITAL ENCOUNTER (EMERGENCY)
Dept: HOSPITAL 41 - JD.ED | Age: 22
Discharge: LEFT BEFORE DISCHARGE | End: 2022-07-13
Payer: SELF-PAY

## 2022-07-13 DIAGNOSIS — Z53.21: Primary | ICD-10-CM
